# Patient Record
(demographics unavailable — no encounter records)

---

## 2025-04-11 NOTE — PHYSICAL EXAM
[Healthy Appearing] : healthy appearing [Well Nourished] : well nourished [Interactive] : interactive [Normal Appearance] : normal appearance [Well formed] : well formed [Normally Set] : normally set [Goiter] : goiter [Abdomen Soft] : soft [Abdomen Tenderness] : non-tender [] : no hepatosplenomegaly [Normal] : normal  [de-identified] :  bpm

## 2025-04-11 NOTE — DISCUSSION/SUMMARY
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 She is a well appearing adolescent female with regular menses who is at the 86th percentile for height, 60th percentile for weight and 38th percentile for BMI. Review of recent labs shows a suppressed TSH and positive TPO and thyroglobulin Abs. In 2023, Maddie presented with hyperthyroidism, but lab work was consistent with presumed Hashitoxicosis and her TFTs normalized, never requiring treatment. Her mother had a similar episode when she was younger. Today I ordered further lab work: TSH, total T4, free T4, total T3, TSH receptor Ab, TSI. CBC and CMP were normal. Ordered thyroid u/s to evaluate for an autonomous nodule. Management to be determined. Provided letter to not participate in sports until cleared; also requested permission to use the elevator at school.

## 2025-04-11 NOTE — ADDENDUM
[FreeTextEntry1] : Labs from 4/3/25 showed: TSH suppressed, total T4 18.9 ug/dL (H), free T4 3.5 ng/dL (H), total T3 266 ng/dL (H); normal: TSH receptor Ab.  TSI and thyroid u/s pending (scheduled for 4/8).  Spoke with father regarding results - while Graves is not fully ruled out, TSH receptor Ab is negative - raising concern again for Hashitoxicosis. Maddie had to leave school early due to her dizziness.  Discussed starting atenolol 25 mg daily given she is very hyperthyroid currently. Family to monitor heart rate and stop if resting HR is < 60 bpm. In addition, given degree of symptoms - while it may not help completely if not Graves - can trial methimazole and follow labs closely. Prescribed 5 mg BID. Reviewed risks/benefits with father.  Will repeat TFTs in 2 weeks - sooner if any concerns.  Will repeat labs in 2 weeks.   ADD: Thyroid u/s on 4/8/25 showed heterogeneous and hypervascular thyroid gland consistent with thyroiditis. Both lobes show numerous small hypoechoic foci. No dominant nodule.

## 2025-04-11 NOTE — HISTORY OF PRESENT ILLNESS
[Regular Periods] : regular periods [FreeTextEntry2] : Maddie is a 13 year 7 month old female with Hashimotos (not on treatment) who presented with Hashitoxicosis who returns for follow up. She was initially referred in 5/2023. Review of labs from 5/15/2023 showed: free T4 of 2.0 ng/dL (H), TSH 0.01 mIU/L (L). Reported palpations, difficulty falling asleep and weight loss (8 lbs over 6 months). Of note, Maddie tested positive for COVID in 7/2022 and was vaccinated with 2 doses in 11/2021.  At my visit, Maddie had a palpable thyroid on exam; HR was slightly elevated and BP was normal. Labs from 5/18/23: TSH suppressed, free T4 2.2 ng/dL (H), total T4 14.3 ug/dL (H), total T3 185 ng/dL, positive thyroid peroxidase and thyroglobulin Abs; negative TSH receptor Ab and TSI. Labs suggestive of Hashitoxicosis. Repeat labs on 6/8/23 improved: TSH suppressed, total T4 8.9 ug/dL, free T4 1.4 ng/dL, total T3 163 ng/dL. Then repeat labs on 7/10/23 and 9/11/23 were normal. She was last seen in 11/2023 and TFTs were normal. Recommended TFTs once yearly by her pediatrician - sooner if any concerns.   Maddie now returns for follow up 1.5 years later. Appt scheduled today after lab work was sent in. Labs from 4/1/25 showed: TSH 0.038 uIU/mL (L), thyroid peroxidase Ab 200 IU/mL (H), thyroglobulin Ab 197.3 IU/mL (H); normal: CBC, CMP.  Lab work was done due to many recent symptoms. Started losing weight since 1/2025; from 11/2024 (weight reported was ~ 119 lbs) to now she lost ~ 10 lbs.  Very dizzy over the last few days; out of breath when walking.   Mother had an episode of hyperthyroidism at the same age as Maddie. Improved on its own and never needed treatment.    [FreeTextEntry1] : LMP 3/10/25

## 2025-04-11 NOTE — PHYSICAL EXAM
[Healthy Appearing] : healthy appearing [Well Nourished] : well nourished [Interactive] : interactive [Normal Appearance] : normal appearance [Well formed] : well formed [Normally Set] : normally set [Goiter] : goiter [Abdomen Soft] : soft [Abdomen Tenderness] : non-tender [] : no hepatosplenomegaly [Normal] : normal  [de-identified] :  bpm

## 2025-04-11 NOTE — HISTORY OF PRESENT ILLNESS
[Regular Periods] : regular periods [FreeTextEntry2] : Maddie is a 13 year 7 month old female with Hashimotos (not on treatment) who presented with Hashitoxicosis who returns for follow up. She was initially referred in 5/2023. Review of labs from 5/15/2023 showed: free T4 of 2.0 ng/dL (H), TSH 0.01 mIU/L (L). Reported palpations, difficulty falling asleep and weight loss (8 lbs over 6 months). Of note, Maddie tested positive for COVID in 7/2022 and was vaccinated with 2 doses in 11/2021.  At my visit, Maddie had a palpable thyroid on exam; HR was slightly elevated and BP was normal. Labs from 5/18/23: TSH suppressed, free T4 2.2 ng/dL (H), total T4 14.3 ug/dL (H), total T3 185 ng/dL, positive thyroid peroxidase and thyroglobulin Abs; negative TSH receptor Ab and TSI. Labs suggestive of Hashitoxicosis. Repeat labs on 6/8/23 improved: TSH suppressed, total T4 8.9 ug/dL, free T4 1.4 ng/dL, total T3 163 ng/dL. Then repeat labs on 7/10/23 and 9/11/23 were normal. She was last seen in 11/2023 and TFTs were normal. Recommended TFTs once yearly by her pediatrician - sooner if any concerns.   Maddie now returns for follow up 1.5 years later. Appt scheduled today after lab work was sent in. Labs from 4/1/25 showed: TSH 0.038 uIU/mL (L), thyroid peroxidase Ab 200 IU/mL (H), thyroglobulin Ab 197.3 IU/mL (H); normal: CBC, CMP.  Lab work was done due to many recent symptoms. Started losing weight since 1/2025; from 11/2024 (weight reported was ~ 119 lbs) to now she lost ~ 10 lbs.  Very dizzy over the last few days; out of breath when walking.   Mother had an episode of hyperthyroidism at the same age as Mdadie. Improved on its own and never needed treatment.    [FreeTextEntry1] : LMP 3/10/25

## 2025-04-11 NOTE — PHYSICAL EXAM
[Healthy Appearing] : healthy appearing [Well Nourished] : well nourished [Interactive] : interactive [Normal Appearance] : normal appearance [Well formed] : well formed [Normally Set] : normally set [Goiter] : goiter [Abdomen Soft] : soft [] : no hepatosplenomegaly [Abdomen Tenderness] : non-tender [Normal] : normal  [de-identified] :  bpm

## 2025-04-24 NOTE — HISTORY OF PRESENT ILLNESS
[FreeTextEntry2] : Maddie is a 13 year 7 month old female with Hashimotos (not on treatment) who presented with 2 episodes of presumed Hashitoxicosis who returns for follow up. She was initially referred in 5/2023. Review of labs from 5/15/2023 showed: free T4 of 2.0 ng/dL (H), TSH 0.01 mIU/L (L). Reported palpations, difficulty falling asleep and weight loss (8 lbs over 6 months). Of note, Maddie tested positive for COVID in 7/2022 and was vaccinated with 2 doses in 11/2021. At my visit, Maddie had a palpable thyroid on exam; HR was slightly elevated and BP was normal. Labs from 5/18/23: TSH suppressed, free T4 2.2 ng/dL (H), total T4 14.3 ug/dL (H), total T3 185 ng/dL, positive thyroid peroxidase and thyroglobulin Abs; negative TSH receptor Ab and TSI. Labs suggestive of Hashitoxicosis. Repeat labs on 6/8/23 improved: TSH suppressed, total T4 8.9 ug/dL, free T4 1.4 ng/dL, total T3 163 ng/dL. Then repeat labs on 7/10/23 and 9/11/23 were normal. She was last seen in 11/2023 and TFTs were normal. Recommended TFTs once yearly by her pediatrician - sooner if any concerns.  Maddie then returned 1.5 years later on 4/3/25. Appt scheduled after lab work was sent in. Labs sent due to 10 lb weight loss and feeling dizzy out of breath when walking. Labs from [facility] on 4/1/25 showed: TSH 0.038 uIU/mL (L), thyroid peroxidase Ab 200 IU/mL (H), thyroglobulin Ab 197.3 IU/mL (H); normal: CBC, CMP. At my visit, Maddie appeared clinically hyperthyroid. Labs from 4/3/25 showed: TSH suppressed, total T4 18.9 ug/dL (H), free T4 3.5 ng/dL (H), total T3 266 ng/dL (H); normal: TSH receptor Ab, TSI. Due to current symptoms, trial of methimazole at 5 mg BID and atenolol 25 mg daily was started. Thyroid u/s on 4/8/25 showed heterogeneous and hypervascular thyroid gland consistent with thyroiditis. Both lobes show numerous small hypoechoic foci. No dominant nodule. Recommended repeat labs in 2 weeks.   Maddie now returns for follow up. Repeat labs on 4/22/25 shows: TSH 0.01 uIU/mL, total T4 15.6 ug/dL (H), free T4 2.1 ng/dL (slightly elevated), total T3 207 ng/dL (borderline elevated).  Family says they got labs done early because Maddie was feeling dizzy on Tuesday; otherwise has been feeling better. Feels weak/tired when climbing stairs for months. Since 4/3/25 - no change in weight.  She stopped the atenolol after 2 days - father thought they only should take it for 48 hours. She has been taking methimazole 5 mg BID - no missed doses.   She does dance, tae rupal do, volleyball.   Mother had an episode of hyperthyroidism at the same age as Maddie. Improved on its own and never needed treatment.     [Regular Periods] : regular periods [FreeTextEntry1] : LMP 4/16/25

## 2025-06-12 NOTE — DISCUSSION/SUMMARY
[FreeTextEntry1] : Maddie is a 13 year 9 month old female with Hashimotos (not on treatment) who presented with 2 episodes of presumed Hashitoxicosis who returns for follow up. She is a well appearing adolescent female with regular menses who is at the 85th percentile for height, 73rd percentile for weight and 58th percentile for BMI. Since 4/2025, she has gained ~ 11 lbs (the weight she previously lost prior to becoming hypothyroid). Review of recent lab work from 6/9//25 shows: TSH 6.71 uIU/mL (H), total T4 7.4 ug/dL, free T4 0.8 ng/dL (L), total T3 117 ng/dL. Maddie feels better but is mildly hypothyroid. I therefore recommended decreasing her methimazole from 5 mg qAM and 7.5 mg qPM TO 5 mg BID. Discussed that further decrease in methimazole will likely be needed and it may even need to be discontinued shortly. Will monitor closely - repeat TFTs in 2 weeks - sooner if any concerns.   Provided clearance letter for sports.

## 2025-06-12 NOTE — HISTORY OF PRESENT ILLNESS
[FreeTextEntry2] : Maddie is a 13 year 9 month old female with Hashimotos (not on treatment) who presented with 2 episodes of presumed Hashitoxicosis who returns for follow up. She was initially referred in 5/2023. Review of labs from 5/15/2023 showed: free T4 of 2.0 ng/dL (H), TSH 0.01 mIU/L (L). Reported palpations, difficulty falling asleep and weight loss (8 lbs over 6 months). Of note, Maddie tested positive for COVID in 7/2022 and was vaccinated with 2 doses in 11/2021. At my visit, Maddie had a palpable thyroid on exam; HR was slightly elevated and BP was normal. Labs from 5/18/23: TSH suppressed, free T4 2.2 ng/dL (H), total T4 14.3 ug/dL (H), total T3 185 ng/dL, positive thyroid peroxidase and thyroglobulin Abs; negative TSH receptor Ab and TSI. Labs suggestive of Hashitoxicosis. Repeat labs on 6/8/23 improved: TSH suppressed, total T4 8.9 ug/dL, free T4 1.4 ng/dL, total T3 163 ng/dL. Then repeat labs on 7/10/23 and 9/11/23 were normal. She was last seen in 11/2023 and TFTs were normal. Recommended TFTs once yearly by her pediatrician - sooner if any concerns.  Maddie then returned 1.5 years later on 4/3/25. Appt scheduled after lab work was sent in. Labs sent due to 10 lb weight loss and feeling dizzy out of breath when walking. Labs from [facility] on 4/1/25 showed: TSH 0.038 uIU/mL (L), thyroid peroxidase Ab 200 IU/mL (H), thyroglobulin Ab 197.3 IU/mL (H); normal: CBC, CMP. At my visit, Maddie appeared clinically hyperthyroid. Labs from 4/3/25 showed: TSH suppressed, total T4 18.9 ug/dL (H), free T4 3.5 ng/dL (H), total T3 266 ng/dL (H); normal: TSH receptor Ab, TSI. Due to current symptoms, trial of methimazole at 5 mg BID and atenolol 25 mg daily was started. Thyroid u/s on 4/8/25 showed heterogeneous and hypervascular thyroid gland consistent with thyroiditis. Both lobes show numerous small hypoechoic foci. No dominant nodule. Recommended repeat labs in 2 weeks. She was last seen in 4/2025; repeat labs on 4/22/25 shows: TSH 0.01 uIU/mL, total T4 15.6 ug/dL (H), free T4 2.1 ng/dL (slightly elevated), total T3 207 ng/dL (borderline elevated). Labs done early because Maddie was not feeling well (dizzy, weak). Increased methimazole from 5 mg BID to 5 mg qAM and 7.5 mg qPM. Labs repeated on 5/9/25: TSH suppressed; normal: total T4, free T4, total T3.  Maddie now returns for follow up. Repeat labs on 6/9//25 shows: TSH 6.71 uIU/mL (H), total T4 7.4 ug/dL, free T4 0.8 ng/dL (L), total T3 117 ng/dL. She has been taking methimazole 5 mg qAM and 7.5 mg qPM - no missed doses. Since 4/2024, she gained ~ 11 lbs (which is what she lost when she became hyperthyroid).   She does dance, tae rupal do, volleyball.  Mother had an episode of hyperthyroidism at the same age as Maddie. Improved on its own and never needed treatment.